# Patient Record
Sex: MALE | URBAN - METROPOLITAN AREA
[De-identification: names, ages, dates, MRNs, and addresses within clinical notes are randomized per-mention and may not be internally consistent; named-entity substitution may affect disease eponyms.]

---

## 2024-05-30 ENCOUNTER — TELEPHONE (OUTPATIENT)
Dept: OPHTHALMOLOGY | Facility: CLINIC | Age: 74
End: 2024-05-30
Payer: OTHER GOVERNMENT

## 2024-05-30 NOTE — TELEPHONE ENCOUNTER
Left vm for pt letting him know that Dr. Anderson is unable to make it to his appointment tomorrow, and that we are having to reschedule his appointment to 6/21 at 2:30 pm and that if he is unable to make that and needs a different appointment or if he has any questions or concerns to please call us back.

## 2024-07-12 ENCOUNTER — DOCUMENTATION ONLY (OUTPATIENT)
Dept: OPHTHALMOLOGY | Facility: CLINIC | Age: 74
End: 2024-07-12

## 2024-07-12 ENCOUNTER — OFFICE VISIT (OUTPATIENT)
Dept: OPHTHALMOLOGY | Facility: CLINIC | Age: 74
End: 2024-07-12
Payer: OTHER GOVERNMENT

## 2024-07-12 DIAGNOSIS — H26.9 CORTICAL CATARACT OF RIGHT EYE: ICD-10-CM

## 2024-07-12 DIAGNOSIS — H26.9 CORTICAL CATARACT OF LEFT EYE: Primary | ICD-10-CM

## 2024-07-12 DIAGNOSIS — H40.1134 PRIMARY OPEN ANGLE GLAUCOMA (POAG) OF BOTH EYES, INDETERMINATE STAGE: ICD-10-CM

## 2024-07-12 PROCEDURE — 99213 OFFICE O/P EST LOW 20 MIN: CPT | Mod: PBBFAC | Performed by: OPHTHALMOLOGY

## 2024-07-12 PROCEDURE — 99999 PR PBB SHADOW E&M-EST. PATIENT-LVL III: CPT | Mod: PBBFAC,,, | Performed by: OPHTHALMOLOGY

## 2024-07-12 RX ORDER — TAMSULOSIN HYDROCHLORIDE 0.4 MG/1
0.4 CAPSULE ORAL
COMMUNITY
Start: 2024-02-02

## 2024-07-12 RX ORDER — FOLIC ACID 1 MG/1
1 TABLET ORAL
COMMUNITY
Start: 2024-06-20

## 2024-07-12 RX ORDER — CYCLOBENZAPRINE HCL 5 MG
5 TABLET ORAL 3 TIMES DAILY PRN
COMMUNITY

## 2024-07-12 RX ORDER — METHADONE HYDROCHLORIDE 5 MG/1
5 TABLET ORAL
COMMUNITY
Start: 2024-06-20

## 2024-07-12 RX ORDER — PREGABALIN 150 MG/1
150 CAPSULE ORAL
COMMUNITY

## 2024-07-12 RX ORDER — LEVETIRACETAM 1000 MG/1
1000 TABLET ORAL
COMMUNITY
Start: 2024-05-15

## 2024-07-12 RX ORDER — KETOROLAC TROMETHAMINE 5 MG/ML
1 SOLUTION OPHTHALMIC 4 TIMES DAILY
Qty: 3 ML | Refills: 0 | Status: SHIPPED | OUTPATIENT
Start: 2024-07-12

## 2024-07-12 RX ORDER — PREDNISOLONE ACETATE 10 MG/ML
1 SUSPENSION/ DROPS OPHTHALMIC 4 TIMES DAILY
Qty: 5 ML | Refills: 1 | Status: SHIPPED | OUTPATIENT
Start: 2024-07-12

## 2024-07-12 RX ORDER — DOCUSATE SODIUM 100 MG/1
100 CAPSULE, LIQUID FILLED ORAL
COMMUNITY

## 2024-07-12 RX ORDER — AMLODIPINE BESYLATE 10 MG/1
10 TABLET ORAL
COMMUNITY
Start: 2023-12-31

## 2024-07-12 RX ORDER — TADALAFIL 20 MG/1
20 TABLET ORAL
COMMUNITY
Start: 2024-02-02

## 2024-07-12 NOTE — PROGRESS NOTES
Short Stay Record    Diagnosis: Nuclear Sclerotic Cataract left    CC: Blurry Vision     HPI:  King ELMER Olivares Jr. is a 74 y.o. male who presents for evaluation prior to ophthalmic surgery. No current complaints.     No past medical history on file.  No past surgical history on file.  Social History     Tobacco Use    Smoking status: Unknown    Smokeless tobacco: Not on file   Substance Use Topics    Alcohol use: Not on file     No family history on file.  Review of patient's allergies indicates:  Not on File      Current Outpatient Medications:     amLODIPine (NORVASC) 10 MG tablet, Take 10 mg by mouth., Disp: , Rfl:     cyclobenzaprine (FLEXERIL) 5 MG tablet, Take 5 mg by mouth 3 (three) times daily as needed., Disp: , Rfl:     docusate sodium (COLACE) 100 MG capsule, Take 100 mg by mouth., Disp: , Rfl:     folic acid (FOLVITE) 1 MG tablet, Take 1 mg by mouth., Disp: , Rfl:     ketorolac 0.5% (ACULAR) 0.5 % Drop, Place 1 drop into the left eye 4 (four) times daily., Disp: 3 mL, Rfl: 0    latanoprost 0.005 % dpem, Apply 1 drop to eye nightly., Disp: , Rfl:     levETIRAcetam (KEPPRA) 1000 MG tablet, Take 1,000 mg by mouth., Disp: , Rfl:     methadone (DOLOPHINE) 5 MG tablet, Take 5 mg by mouth., Disp: , Rfl:     olodateroL (STRIVERDI RESPIMAT) 2.5 mcg/actuation Mist, Inhale into the lungs., Disp: , Rfl:     prednisoLONE acetate (PRED FORTE) 1 % DrpS, Place 1 drop into the left eye 4 (four) times daily., Disp: 5 mL, Rfl: 1    pregabalin (LYRICA) 150 MG capsule, Take 150 mg by mouth., Disp: , Rfl:     tadalafiL (CIALIS) 20 MG Tab, Take 20 mg by mouth., Disp: , Rfl:     tamsulosin (FLOMAX) 0.4 mg Cap, Take 0.4 mg by mouth., Disp: , Rfl:     Review of Systems:  10 Pt ROS negative except as stated in HPI    Physical Exam:  General Appearance:    A&Ox3, no distress, appears stated age   Head:    Normocephalic, without obvious abnormality, atraumatic   Eyes:    PERRL, EOM's intact   Back:     Symmetric, no curvature   Lungs:      respirations unlabored   Chest Wall:    No tenderness or deformity    Heart:  Abdomen:  Extremities:  Skin:    S1 and S2 present    Soft, non-tender    Extremities normal, atraumatic    Skin color, texture, turgor normal     Patient is stable for ophthalmic surgery under local and MAC.       _

## 2024-07-12 NOTE — PROGRESS NOTES
HPI     Cataract     Additional comments: [facility]   Denies any pain or irritation. [facility] blurry. No drops. Pt states issues with   distance, reading, driving, and glare. 100% compliant with gtts.            Comments    Latanoprost QHS OU          Last edited by César Ramirez on 7/12/2024 10:10 AM.            Assessment /Plan     For exam results, see Encounter Report.    Cortical cataract of left eye  Visually Significant Cataract OS > OD  Patient reports decreased vision consistent with the clinical amount of lenticular opacity. Cataract is visually significant and affects activities of daily living including reading and glare. Risks, benefits, and alternatives to cataract surgery were discussed. Discussion of risks included increased dryness, possibility of infection, need for more surgery, as well as permanent vision loss.The patient expressed a desire to proceed with surgery with the potential for some reasonable degree of visual improvement. Recommended regular use of artificial tears and good lid hygiene to optimize surgical outcome.     Discussed intraocular lens options. Monofocal - Distance. Patient understands that glasses will be generally needed at all times for near vision and often for finer distance correction especially for higher degrees of astigmatism.     Final visual acuity may be limited by astigmatism and glaucoma damage    Combination iStent/goniotomy    Other considerations: Trypan Blue    Dilation: good  Alpha Blockers: Flomax    Following medications prescribed:  Prednisolone   Ketorolac      Cortical cataract of right eye  Will address after OS    Primary open angle glaucoma (POAG) of both eyes, indeterminate stage  Surgical options were reviewed at great length with the patient, and recommended iStent /goniotomy in hopes of reducing the IOP in addition to cataract surgery.    A lengthy discussion of the risks, benefits and alternatives was presented  to the patient (including balancing the immediate risks of vision loss from surgery vs. the likelihood of continued vision loss from the inadequately controlled glaucoma). The need for frequent, careful follow-up exams for monitoring and other possible postop adjustments was also discussed.

## 2024-09-30 ENCOUNTER — TELEPHONE (OUTPATIENT)
Dept: OPHTHALMOLOGY | Facility: CLINIC | Age: 74
End: 2024-09-30
Payer: OTHER GOVERNMENT

## 2024-09-30 NOTE — TELEPHONE ENCOUNTER
Spoke with patient. He is postponing cat. Sx. He wishes to have a procedure done on his foot first and will reschedule after healing from that procedure.